# Patient Record
Sex: MALE | Race: BLACK OR AFRICAN AMERICAN | NOT HISPANIC OR LATINO | Employment: FULL TIME | URBAN - METROPOLITAN AREA
[De-identification: names, ages, dates, MRNs, and addresses within clinical notes are randomized per-mention and may not be internally consistent; named-entity substitution may affect disease eponyms.]

---

## 2024-09-27 ENCOUNTER — HOSPITAL ENCOUNTER (EMERGENCY)
Facility: HOSPITAL | Age: 39
Discharge: HOME | End: 2024-09-27
Payer: COMMERCIAL

## 2024-09-27 ENCOUNTER — APPOINTMENT (OUTPATIENT)
Dept: RADIOLOGY | Facility: HOSPITAL | Age: 39
End: 2024-09-27
Payer: COMMERCIAL

## 2024-09-27 VITALS
OXYGEN SATURATION: 99 % | DIASTOLIC BLOOD PRESSURE: 99 MMHG | HEART RATE: 89 BPM | WEIGHT: 280 LBS | HEIGHT: 74 IN | BODY MASS INDEX: 35.94 KG/M2 | TEMPERATURE: 97.8 F | SYSTOLIC BLOOD PRESSURE: 153 MMHG | RESPIRATION RATE: 18 BRPM

## 2024-09-27 DIAGNOSIS — S93.601A FOOT SPRAIN, RIGHT, INITIAL ENCOUNTER: ICD-10-CM

## 2024-09-27 DIAGNOSIS — S93.602A FOOT SPRAIN, LEFT, INITIAL ENCOUNTER: Primary | ICD-10-CM

## 2024-09-27 DIAGNOSIS — S83.92XA SPRAIN OF LEFT LOWER LEG, INITIAL ENCOUNTER: ICD-10-CM

## 2024-09-27 PROCEDURE — 73630 X-RAY EXAM OF FOOT: CPT | Mod: RIGHT SIDE | Performed by: RADIOLOGY

## 2024-09-27 PROCEDURE — 73630 X-RAY EXAM OF FOOT: CPT | Mod: LT

## 2024-09-27 PROCEDURE — 99284 EMERGENCY DEPT VISIT MOD MDM: CPT

## 2024-09-27 PROCEDURE — 73630 X-RAY EXAM OF FOOT: CPT | Mod: RT

## 2024-09-27 PROCEDURE — 2500000001 HC RX 250 WO HCPCS SELF ADMINISTERED DRUGS (ALT 637 FOR MEDICARE OP): Performed by: PHYSICIAN ASSISTANT

## 2024-09-27 PROCEDURE — 99283 EMERGENCY DEPT VISIT LOW MDM: CPT

## 2024-09-27 RX ORDER — IBUPROFEN 600 MG/1
600 TABLET ORAL ONCE
Status: COMPLETED | OUTPATIENT
Start: 2024-09-27 | End: 2024-09-27

## 2024-09-27 RX ADMIN — IBUPROFEN 600 MG: 600 TABLET, FILM COATED ORAL at 14:22

## 2024-09-27 ASSESSMENT — PAIN - FUNCTIONAL ASSESSMENT
PAIN_FUNCTIONAL_ASSESSMENT: 0-10
PAIN_FUNCTIONAL_ASSESSMENT: 0-10

## 2024-09-27 ASSESSMENT — PAIN SCALES - GENERAL
PAINLEVEL_OUTOF10: 3
PAINLEVEL_OUTOF10: 8
PAINLEVEL_OUTOF10: 8

## 2024-09-27 ASSESSMENT — COLUMBIA-SUICIDE SEVERITY RATING SCALE - C-SSRS
2. HAVE YOU ACTUALLY HAD ANY THOUGHTS OF KILLING YOURSELF?: NO
1. IN THE PAST MONTH, HAVE YOU WISHED YOU WERE DEAD OR WISHED YOU COULD GO TO SLEEP AND NOT WAKE UP?: NO
6. HAVE YOU EVER DONE ANYTHING, STARTED TO DO ANYTHING, OR PREPARED TO DO ANYTHING TO END YOUR LIFE?: NO

## 2024-09-27 ASSESSMENT — PAIN DESCRIPTION - FREQUENCY: FREQUENCY: CONSTANT/CONTINUOUS

## 2024-09-27 ASSESSMENT — PAIN DESCRIPTION - LOCATION
LOCATION: LEG
LOCATION: LEG

## 2024-09-27 ASSESSMENT — PAIN DESCRIPTION - ONSET: ONSET: SUDDEN

## 2024-09-27 ASSESSMENT — PAIN DESCRIPTION - PAIN TYPE: TYPE: ACUTE PAIN

## 2024-09-27 ASSESSMENT — PAIN DESCRIPTION - DESCRIPTORS: DESCRIPTORS: ACHING;BURNING;THROBBING

## 2024-09-27 ASSESSMENT — PAIN DESCRIPTION - ORIENTATION: ORIENTATION: RIGHT

## 2024-09-27 ASSESSMENT — PAIN DESCRIPTION - PROGRESSION: CLINICAL_PROGRESSION: NOT CHANGED
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
## 2024-09-27 NOTE — Clinical Note
John Fraser was seen and treated in our emergency department on 9/27/2024.  He may return to work on 09/28/2024.       If you have any questions or concerns, please don't hesitate to call.      Blayne Sue PA-C

## 2024-11-18 ENCOUNTER — OFFICE VISIT (OUTPATIENT)
Dept: ORTHOPEDIC SURGERY | Facility: CLINIC | Age: 39
End: 2024-11-18
Payer: COMMERCIAL

## 2024-11-18 VITALS — BODY MASS INDEX: 36.57 KG/M2 | HEIGHT: 74 IN | WEIGHT: 285 LBS

## 2024-11-18 DIAGNOSIS — S93.609S: Primary | ICD-10-CM

## 2024-11-18 DIAGNOSIS — Z02.6 ENCOUNTER RELATED TO WORKER'S COMPENSATION CLAIM: ICD-10-CM

## 2024-11-18 DIAGNOSIS — S90.30XS: ICD-10-CM

## 2024-11-18 PROCEDURE — 99214 OFFICE O/P EST MOD 30 MIN: CPT | Performed by: ORTHOPAEDIC SURGERY

## 2024-11-18 RX ORDER — PREDNISONE 10 MG/1
10 TABLET ORAL SEE ADMIN INSTRUCTIONS
Qty: 42 TABLET | Refills: 0 | Status: SHIPPED | OUTPATIENT
Start: 2024-11-18 | End: 2024-12-09

## 2024-11-18 ASSESSMENT — PAIN DESCRIPTION - DESCRIPTORS: DESCRIPTORS: ACHING;SHARP;PRESSURE

## 2024-11-18 ASSESSMENT — PAIN - FUNCTIONAL ASSESSMENT: PAIN_FUNCTIONAL_ASSESSMENT: 0-10

## 2024-11-18 ASSESSMENT — PAIN SCALES - GENERAL: PAINLEVEL_OUTOF10: 7

## 2024-11-18 NOTE — PROGRESS NOTES
Patient was reviewed and discussed with KASEY and/or orthopedic resident.  Patient was seen and evaluated in conjunction with KASEY and/or orthopedic resident. Findings and treatment plan were discussed and approved by myself, Dr. Oliveira.    Exam: Palpable spurs dorsal right midfoot.  No crepitus active motion.  No plantar pain.    I personally reviewed the following radiographic exams: Injury x-rays reviewed shows some chronic old avulsion fragments over the dorsal midfoot.  Midfoot arthritic change on the right.  Well aligned Lisfranc joint.  No acute injury.    Assessment: Crush injury both feet with pre-existing right midfoot arthritis.    Plan: Discussed nonoperative and operative options in detail.   Risk and benefits discussed in detail. All questions answered today.  Recovery timeline and expectations discussed in detail.  Agree with nonoperative treatment.  Discussed discuss supportive shoe wear.  Recommend course of therapy to work on strength and flexibility.  Will place on a prednisone taper to try to calm the inflammation down.  No surgery indicated.
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
## 2024-11-18 NOTE — PROGRESS NOTES
HPI    Physical Exam      _____________MEDCO-14 Physician's Report of Work Ability [facility]-3914_________________      Injured Worker:  Date of injury: Claim number:   John Fraser *** ***     Date of last appointment /examination: Date of this appointment /examination:  Date of next appointment /examination:    ***   11/18/24  ***     Employer name: Injured worker's position of employment at time of injury:   ***    ***     MEDCO-14 submission   1.  Please select one of the following options: {MEDCO-14 SUBMISSION SEC 1:8209474247}         Employment/Occupation (Complete this section and proceed to section 3)  2. Updates made to Employment/Occupation Section: {MEDCO-14 SEC UPDATES; YES/NO:4650420341}    Have you reviewed the description of the injured worker's job held on the date of the injury (former position of employment)? {MEDCO-14 SEC 1 RESPONSE; YES/NO:9264436986}       Work Status/Injured worker's capabilities.   3a. Updates made to work status/injured worker's capabilities: {MEDCO-14 SEC UPDATES; YES/NO:6923097125}    Does the injured worker have any physical or health restrictions related to allowed conditions in the claim? {MEDCO-14 SEC 2 RESPONSE; RESTRICTIONS YES/NO:1854761908}    3b. If restrictions, can the injured worker return to full duties of his/her job held on the date of injury (former position of employment)?     {MEDCO-14 SEC 3B RESPONSE; STATUS YES/NO:2040251800}   3c. Please indicate which of the activities listed below the injured worker can perform (even if the response to 3B is No.)     The injured worker is not released to the former position of employment but may return to available and appropriate work with restrictions, the possible return to work date: ***.     The injured worker can perform simple grasping with: {MEDCO-14 SEC 3C RESPONSE; ACTIVITY HAND:6627925643}  The injured worker can perform repetitive wrist motion with: {MEDCO-14 SEC 3C RESPONSE; ACTIVITY  HAND:9192636424}  The injured worker's dominant hand is: {MEDCO-14 SEC 3C RESPONSE; DOMIN HAND:1043954961}  The injured worker can perform repetitive actions to operate foot controls or motor vehicles with: {MEDCO-14 SEC 3C RESPONSE; ACTIVITY FEET:3210508027}    If the injured worker is taking prescribed medications for the allowed conditions in this claim, can the injured worker safely:   *Operate heavy machinery: {MEDCO-14 SEC 3C RESPONSE; MISC YES/NO:8597510813}  *Drive: {MEDCO-14 SEC 3C RESPONSE; MISC YES/NO:7880309388}  *Perform other critical job tasks as defined by any source listed above in section 2: {MEDCO-14 SEC 3C RESPONSE; MISC YES/NO:5488358642}     Please indicate the following: Never, Occasionally, Frequently, Continuously    Activity   Bend: {M14 ASMT:2787896597}  Squat / kneel: {M14 ASMT:7663223130}  Twist / turn: {M14 ASMT:4851823769}  Climb: {M14 ASMT:3851180070} Reach above shoulder: {M14 ASMT:5240347357}  Type / Keyboard: {M14 ASMT:4652363071}  Work w/cold substances: {M14 ASMT:8972806039}  Work w/hot substances: {M14 ASMT:7435224982}      Lifting/Carrying                                      Pushing/pulling  0 - 10 lbs: {M14 ASMT:8684393581}  11 - 20 lbs: {M14 ASMT:6080540581}  21 - 40 lbs: {M14 ASMT:7259003730}  41 - 60 lbs: {M14 ASMT:7803405865}  61 - 100 lbs: {M14 ASMT:1603165157} 0 - 25 lbs: {M14 ASMT:2932338239}  26 - 40 lbs: {M14 ASMT:3918702236}  41 -  60 lbs: {M14 ASMT:1352208358}  61 - 100 lbs: {M14 ASMT:0062222063}  100 + lbs: {M14 ASMT:3634538864}       How many total hours can the injured worker work?  {MEDCO-14 RESPONSE; DAYS:8686248890}{MEDCO-14  RESPONSE; HRS:8943484405}     In an eight-hour workday, how many total hours is the injured worker potentially able to work?  ***    Sit: {MEDCO-14  RESPONSE; SIT/STAND/WALK HRS:8499126606} {MEDCO-14  RESPONSE; CONT/BREAK:9982346576}  Walk: {MEDCO-14  RESPONSE; SIT/STAND/WALK HRS:8660931700}{MEDCO-14  RESPONSE;  "CONT/BREAK:3084612913}  Stand: {MEDCO-14  RESPONSE; SIT/STAND/WALK HRS:0882204978}{MEDCO-14  RESPONSE; CONT/BREAK:6379008166}          Does the injured worker have any functional restrictions based only on allowed psychological conditions? {MEDCO-14 RESPONSE;FUNCTNL RSTRCTS:7141650798}    *Note: If Yes is indicated please reference the MEDCO-16 as needed.     Additionally, in this space please provide any additional information addressing the  injured worker's capabilities and/or job accommodations which may not be addressed above. {MEDCO-14 RESPONSE; MISC N/A:2715094591}       Disability information   4a.  *Note: If 3B above is \"No\" or dates updated - all 4A fields, including site/location if applicable must be completed    Updates: {MEDCO-14 SEC UPDATES; YES/NO:7856024663}    Complete the chart below and furnish the narrative description of the diagnosis(es), site/location, if applicable, and ICD code for the conditions being treated due to the work- related injury/disease.  Please indicate if the condition is preventing the injured worker from returning to job duties he/she held on the date of injury.       Narrative description of the work-related allowed condition Site/Location if applicable ICD Code Is the condition preventing full duty release to the job injured worker held on date of injury?       {MEDCO-14 SEC UPDATES; YES/NO:4773059221}      {MEDCO-14 SEC UPDATES; YES/NO:0020869765}      {MEDCO-14 SEC UPDATES; YES/NO:8266028157}      {MEDCO-14 SEC UPDATES; YES/NO:3127482565}      {MEDCO-14 SEC UPDATES; YES/NO:2583782319}      {MEDCO-14 SEC UPDATES; YES/NO:8344955770}        4B. List all other relevant conditions that impact treatment of the conditions listed above (e.g., co-morbidities or not yet allowed conditions).   {MEDCO-14 RESPONSE; MISC N/A:1399235703}     Clinical findings:    5. You can reference office notes in lieu of writing clinical findings below.  Updates: {MEDCO-14 SEC UPDATES; " YES/NO:9445659263}    The injured worker is progressing: {MEDCO-14 RESPONSE;PROGRESS:4019193511}    Provide your clinical and objective findings supporting your medical opinion outlined on this form.  List barriers to return to work and reason, for the injured worker's delay in recovery.   {MEDCO-14 RESPONSE; MISC N/A:2499335512}     Maximum medical improvement (MMI):  6. Updates: {MEDCO-14 SEC UPDATES; YES/NO:6013389238}    MMI is a treatment plateau (static or well-stabilized) at which no fundamental       functional or physiological change can be expected within reasonable medical       probability, in spite of continuing medical or rehabilitative procedures.     Has the work-related injury(s) or occupational disease reached MMI based on the definition above? {MEDCO-14 RESPONSE;MMI YES/NO:9431239699}    *Note: An injured worker may need supportive treatment to maintain his or her level of function after reaching MMI. Thus, periodic medical treatment may still be requested and provided.      Vocational rehabilitation:   7. Updates: {MEDCO-14 SEC UPDATES; YES/NO:8135900553}    Vocational rehabilitation is an individualized and voluntary program for an eligible injured worker who needs assistance in safely returning to work or in retaining employment.  This program can be tailored around an injured worker's restrictions and may provide job seeking skills or necessary retraining. Is the injured worker a candidate for vocational rehabilitation services focusing on return to work? {MEDCO-14 RESPONSE;VOC REHB YES/NO:6108065604}     Treating physician signature - mandatory:  8. I certify the information on this form is correct to the best of my knowledge. I am aware that any person who knowingly makes a false statement, misrepresentation, concealment of fact or any other act of fraud to obtain payment as provided by [facility], or who knowingly accepts payment to which that person is not entitled, is subject to felony criminal  prosecution and may be punished, under appropriate criminal provisions. by a fine or imprisonment or both.     Treating physician's name (please print legibly): Blayne Oliveira MD  [facility] provider ([facility]) number: ***   Complete Address, Telephone, Fax number and Date:   ***   Treating physician's signature: Blayne Oliveira MD

## 2025-02-07 ENCOUNTER — APPOINTMENT (OUTPATIENT)
Dept: PHYSICAL THERAPY | Facility: HOSPITAL | Age: 40
End: 2025-02-07

## 2025-09-02 ENCOUNTER — HOSPITAL ENCOUNTER (EMERGENCY)
Facility: HOSPITAL | Age: 40
Discharge: HOME | End: 2025-09-02
Attending: STUDENT IN AN ORGANIZED HEALTH CARE EDUCATION/TRAINING PROGRAM

## 2025-09-02 VITALS
OXYGEN SATURATION: 94 % | BODY MASS INDEX: 35.94 KG/M2 | HEIGHT: 74 IN | RESPIRATION RATE: 18 BRPM | TEMPERATURE: 98.2 F | HEART RATE: 106 BPM | DIASTOLIC BLOOD PRESSURE: 99 MMHG | WEIGHT: 280 LBS | SYSTOLIC BLOOD PRESSURE: 159 MMHG

## 2025-09-02 DIAGNOSIS — H57.11 PAIN OF RIGHT EYE: Primary | ICD-10-CM

## 2025-09-02 PROCEDURE — 99283 EMERGENCY DEPT VISIT LOW MDM: CPT | Performed by: STUDENT IN AN ORGANIZED HEALTH CARE EDUCATION/TRAINING PROGRAM

## 2025-09-02 PROCEDURE — 2500000005 HC RX 250 GENERAL PHARMACY W/O HCPCS

## 2025-09-02 RX ORDER — TETRACAINE HYDROCHLORIDE 5 MG/ML
1 SOLUTION OPHTHALMIC ONCE
Status: COMPLETED | OUTPATIENT
Start: 2025-09-02 | End: 2025-09-02

## 2025-09-02 RX ORDER — FLUORESCEIN SODIUM 1 MG/MG
1 STRIP OPHTHALMIC ONCE
Status: COMPLETED | OUTPATIENT
Start: 2025-09-02 | End: 2025-09-02

## 2025-09-02 RX ORDER — ERYTHROMYCIN 5 MG/G
OINTMENT OPHTHALMIC NIGHTLY
Qty: 1 G | Refills: 0 | Status: SHIPPED | OUTPATIENT
Start: 2025-09-02 | End: 2025-09-12

## 2025-09-02 RX ADMIN — TETRACAINE HYDROCHLORIDE 1 DROP: 5 SOLUTION OPHTHALMIC at 03:17

## 2025-09-02 RX ADMIN — FLUORESCEIN SODIUM 1 STRIP: 1 STRIP OPHTHALMIC at 03:17

## 2025-09-02 ASSESSMENT — PAIN SCALES - GENERAL: PAINLEVEL_OUTOF10: 8

## 2025-09-02 ASSESSMENT — LIFESTYLE VARIABLES
HAVE PEOPLE ANNOYED YOU BY CRITICIZING YOUR DRINKING: NO
HAVE YOU EVER FELT YOU SHOULD CUT DOWN ON YOUR DRINKING: NO
EVER HAD A DRINK FIRST THING IN THE MORNING TO STEADY YOUR NERVES TO GET RID OF A HANGOVER: NO
TOTAL SCORE: 0
EVER FELT BAD OR GUILTY ABOUT YOUR DRINKING: NO

## 2025-09-02 ASSESSMENT — PAIN - FUNCTIONAL ASSESSMENT: PAIN_FUNCTIONAL_ASSESSMENT: 0-10

## 2025-09-02 ASSESSMENT — PAIN DESCRIPTION - FREQUENCY: FREQUENCY: CONSTANT/CONTINUOUS

## 2025-09-02 ASSESSMENT — PAIN DESCRIPTION - ORIENTATION: ORIENTATION: RIGHT

## 2025-09-02 ASSESSMENT — PAIN DESCRIPTION - LOCATION: LOCATION: EYE

## 2025-09-02 ASSESSMENT — PAIN DESCRIPTION - PAIN TYPE: TYPE: ACUTE PAIN

## 2025-09-02 ASSESSMENT — PAIN DESCRIPTION - DESCRIPTORS: DESCRIPTORS: PRESSURE

## 2025-09-04 ASSESSMENT — TONOMETRY
OD_IOP_MMHG: 19
OS_IOP_MMHG: 19